# Patient Record
Sex: MALE | Race: BLACK OR AFRICAN AMERICAN | NOT HISPANIC OR LATINO | Employment: FULL TIME | URBAN - METROPOLITAN AREA
[De-identification: names, ages, dates, MRNs, and addresses within clinical notes are randomized per-mention and may not be internally consistent; named-entity substitution may affect disease eponyms.]

---

## 2024-06-29 ENCOUNTER — HOSPITAL ENCOUNTER (EMERGENCY)
Facility: CLINIC | Age: 29
Discharge: HOME | End: 2024-06-29
Attending: PHYSICIAN ASSISTANT | Admitting: PHYSICIAN ASSISTANT
Payer: COMMERCIAL

## 2024-06-29 VITALS
HEIGHT: 74 IN | SYSTOLIC BLOOD PRESSURE: 114 MMHG | HEART RATE: 59 BPM | TEMPERATURE: 97.6 F | DIASTOLIC BLOOD PRESSURE: 77 MMHG | OXYGEN SATURATION: 99 % | RESPIRATION RATE: 16 BRPM

## 2024-06-29 DIAGNOSIS — S61.412A LACERATION OF LEFT HAND, FOREIGN BODY PRESENCE UNSPECIFIED, INITIAL ENCOUNTER: ICD-10-CM

## 2024-06-29 PROCEDURE — 250N000013 HC RX MED GY IP 250 OP 250 PS 637: Performed by: PHYSICIAN ASSISTANT

## 2024-06-29 PROCEDURE — 99283 EMERGENCY DEPT VISIT LOW MDM: CPT

## 2024-06-29 RX ORDER — CEPHALEXIN 500 MG/1
500 CAPSULE ORAL 4 TIMES DAILY
Qty: 20 CAPSULE | Refills: 0 | Status: SHIPPED | OUTPATIENT
Start: 2024-06-29 | End: 2024-07-04

## 2024-06-29 RX ORDER — CEPHALEXIN 500 MG/1
500 CAPSULE ORAL ONCE
Status: COMPLETED | OUTPATIENT
Start: 2024-06-29 | End: 2024-06-29

## 2024-06-29 RX ADMIN — CEPHALEXIN 500 MG: 500 CAPSULE ORAL at 22:41

## 2024-06-29 ASSESSMENT — COLUMBIA-SUICIDE SEVERITY RATING SCALE - C-SSRS
2. HAVE YOU ACTUALLY HAD ANY THOUGHTS OF KILLING YOURSELF IN THE PAST MONTH?: NO
6. HAVE YOU EVER DONE ANYTHING, STARTED TO DO ANYTHING, OR PREPARED TO DO ANYTHING TO END YOUR LIFE?: NO
1. IN THE PAST MONTH, HAVE YOU WISHED YOU WERE DEAD OR WISHED YOU COULD GO TO SLEEP AND NOT WAKE UP?: NO

## 2024-06-29 ASSESSMENT — ACTIVITIES OF DAILY LIVING (ADL)
ADLS_ACUITY_SCORE: 35
ADLS_ACUITY_SCORE: 35

## 2024-06-30 NOTE — ED PROVIDER NOTES
"  Emergency Department Note      History of Present Illness     Chief Complaint   Laceration    HPI   Herman Hawkins is a 28 year old male who presents to the ED with family for evaluation of a hand laceration. Patient notes that he sustained a laceration to the base of his 5th MT about 2-3 days ago by glass. He cleaned the wound thoroughly and has been keeping it covered with a bandage. He would like wound evaluated. No pain. No pus drainage. No spreading redness. No fevers. He was not evaluated previously.    Independent Historian   None    Review of External Notes   None    Past Medical History     Medical History and Problem List   No past medical history on file.    Medications   cephALEXin (KEFLEX) 500 MG capsule        Surgical History   No past surgical history on file.    Physical Exam     Patient Vitals for the past 24 hrs:   BP Temp Pulse Resp SpO2 Height   06/29/24 2203 114/77 97.6  F (36.4  C) 59 16 99 % 1.88 m (6' 2\")     Physical Exam  HEENT: mmm  Eyes: PERRL B/L  Neck: Supple  CV: Peripheral pulses intact and regular  Resp: Speaking in full sentences without any respiratory distress  Ext:  Left Hand  1cm healing laceration to volar aspect of proximal 5th MC. No surrounding erythema. No warmth. No purulence.  No bony TTP.  Full ROM of digits.  Sensation and perfusion intact throughout hand  Remainder of the skeletal survey is unremarkable  Skin: warm dry well perfused. See above.  Neuro: Alert  Nursing notes and vital signs reviewed.      Diagnostics     Lab Results   Labs Ordered and Resulted from Time of ED Arrival to Time of ED Departure - No data to display    Imaging   No orders to display     Independent Interpretation   None    ED Course      Medications Administered   Medications   cephALEXin (KEFLEX) capsule 500 mg (500 mg Oral $Given 6/29/24 5604)       Procedures   Procedures     Discussion of Management   None    Social Determinants of Health adding to complexity of care   None    ED " Course        Medical Decision Making / Diagnosis     CMS Diagnoses: None    MIPS       None    [facility]   Herman Hawkins is a 28 year old male who presents to the ED for evaluation following a hand laceration that occurred 2-3 days ago. See HPI as above for additional details. Vitals and physical exam as above. Patient declines XR to evaluate for glass FB. No obvious FB noted on exam. Discussed with patient sutures/closure not appropriate given duration of time laceration has been open. Will place patient on prophylactic abx. Patient is unsure of tetanus status and declines update. Discussed conservative cares otherwise. All questions answered. Patient discharged to home in stable condition.    Disposition   The patient was discharged.     Diagnosis     ICD-10-CM    1. Laceration of left hand, foreign body presence unspecified, initial encounter  S61.412A            Discharge Medications   Discharge Medication List as of 6/29/2024 10:25 PM        START taking these medications    Details   cephALEXin (KEFLEX) 500 MG capsule Take 1 capsule (500 mg) by mouth 4 times daily for 5 days, Disp-20 capsule, R-0, Local Print             This record was created at least in part using electronic voice recognition software, so please excuse any typographical errors.         Juan R Lopez PA-C  06/30/24 0029

## 2024-06-30 NOTE — ED TRIAGE NOTES
Patient comes to ED for evaluation of hand lac on Left hand.  Per patient happened 2-3 days ago, would like evaluation. Alert and oriented x 4.     Triage Assessment (Adult)       Row Name 06/29/24 5332          Triage Assessment    Airway WDL WDL        Respiratory WDL    Respiratory WDL WDL        Skin Circulation/Temperature WDL    Skin Circulation/Temperature WDL WDL        Peripheral/Neurovascular WDL    Peripheral Neurovascular WDL WDL        Cognitive/Neuro/Behavioral WDL    Cognitive/Neuro/Behavioral WDL WDL

## 2024-06-30 NOTE — DISCHARGE INSTRUCTIONS
Keep wound clean and covered with bandaid.  Evaluate wound daily for pus drainage, spreading redness, streaking redness up the arm.  Take full course of antibiotics as prescribed.  Use Tylenol and ibuprofen for pain.